# Patient Record
Sex: FEMALE
[De-identification: names, ages, dates, MRNs, and addresses within clinical notes are randomized per-mention and may not be internally consistent; named-entity substitution may affect disease eponyms.]

---
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
## 2025-02-06 ENCOUNTER — NON-APPOINTMENT (OUTPATIENT)
Age: 20
End: 2025-02-06

## 2025-04-30 ENCOUNTER — APPOINTMENT (OUTPATIENT)
Dept: PEDIATRIC NEUROLOGY | Facility: CLINIC | Age: 20
End: 2025-04-30

## 2025-05-19 ENCOUNTER — RX RENEWAL (OUTPATIENT)
Age: 20
End: 2025-05-19

## 2025-06-26 ENCOUNTER — APPOINTMENT (OUTPATIENT)
Dept: PEDIATRIC CARDIOLOGY | Facility: CLINIC | Age: 20
End: 2025-06-26

## (undated) DEVICE — BIPOLAR FORCEP CORD WECK STANDARD 12FT

## (undated) DEVICE — PACK VITRECTOMY

## (undated) DEVICE — KNIFE ALCON MVR V-LANCE 23G (BLACK)

## (undated) DEVICE — GLV 7 PROTEXIS (WHITE)

## (undated) DEVICE — DRAPE TOWEL BLUE 17" X 24"

## (undated) DEVICE — CANNULA ALCON SOFT TIP 25G

## (undated) DEVICE — DRAPE STERI-DRAPE INCISE 13X13"

## (undated) DEVICE — PACK CONSTELLATION POST 23G 10K

## (undated) DEVICE — Device

## (undated) DEVICE — CANNULA ALCON SOFT TIP 23G

## (undated) DEVICE — CANNULA MEDONE FLEXTIP 25G

## (undated) DEVICE — SUT VICRYL 7-0 18" TG140-8 DA

## (undated) DEVICE — SYSTEM ENTRY OPHTHALMIC VALVED 25G

## (undated) DEVICE — GLV 8 PROTEXIS (WHITE)

## (undated) DEVICE — PICK ILLUMINATED 23G

## (undated) DEVICE — DIATHERMY PROBE 25GA

## (undated) DEVICE — CANNULA DUAL BORE 23G

## (undated) DEVICE — LABELS BLANK W PEN

## (undated) DEVICE — INSTR GRIESHABER REVOLUTION SCISSOR 23G (CURVED) DISP

## (undated) DEVICE — DRSG CURITY GAUZE SPONGE 4 X 4" 12-PLY

## (undated) DEVICE — SOL IRR BAL SALT + 500ML

## (undated) DEVICE — CANNULA IRR ALCON ANTERIOR CHAMBER 30G

## (undated) DEVICE — SUT ETHILON 5-0 18" RD-1

## (undated) DEVICE — VENODYNE/SCD SLEEVE CALF MEDIUM

## (undated) DEVICE — FORCEP GRIESHABER SERRATED 23G DISP

## (undated) DEVICE — CANNULA ALCON PROVISC 27G THREADED HUB

## (undated) DEVICE — KNIFE SLIT 2.75MM

## (undated) DEVICE — GLV 6.5 PROTEXIS (WHITE)

## (undated) DEVICE — APPLICATOR COTTON TIP 3" STERILE

## (undated) DEVICE — SET OPTIC BIOM

## (undated) DEVICE — BACKFLUSH SOFT TIP 25G

## (undated) DEVICE — CONSTELLATION VFC PAK